# Patient Record
Sex: FEMALE | Race: WHITE | Employment: FULL TIME | URBAN - NONMETROPOLITAN AREA
[De-identification: names, ages, dates, MRNs, and addresses within clinical notes are randomized per-mention and may not be internally consistent; named-entity substitution may affect disease eponyms.]

---

## 2017-03-09 ENCOUNTER — OFFICE VISIT (OUTPATIENT)
Dept: OPTOMETRY | Age: 27
End: 2017-03-09

## 2017-03-09 DIAGNOSIS — H52.203 MYOPIA OF BOTH EYES WITH ASTIGMATISM: Primary | ICD-10-CM

## 2017-03-09 DIAGNOSIS — H52.13 MYOPIA OF BOTH EYES WITH ASTIGMATISM: Primary | ICD-10-CM

## 2017-03-09 PROCEDURE — 92314 C-LENS FITG TECH OU: CPT | Performed by: OPTOMETRIST

## 2017-03-09 PROCEDURE — 92004 COMPRE OPH EXAM NEW PT 1/>: CPT | Performed by: OPTOMETRIST

## 2017-03-09 RX ORDER — BENOXINATE HCL/FLUORESCEIN SOD 0.4%-0.25%
1 DROPS OPHTHALMIC (EYE) ONCE
Status: COMPLETED | OUTPATIENT
Start: 2017-03-09 | End: 2017-03-09

## 2017-03-09 RX ADMIN — Medication 1 DROP: at 10:14

## 2017-03-09 ASSESSMENT — REFRACTION_WEARINGRX
OS_SPHERE: -1.75
SPECS_TYPE: SVL
OD_CYLINDER: DS
OS_CYLINDER: DS
OD_SPHERE: -2.25

## 2017-03-09 ASSESSMENT — SLIT LAMP EXAM - LIDS
COMMENTS: NORMAL
COMMENTS: NORMAL

## 2017-03-09 ASSESSMENT — REFRACTION_MANIFEST
OS_CYLINDER: DS
OS_SPHERE: -2.00
OD_SPHERE: -2.50
OD_CYLINDER: DS

## 2017-03-09 ASSESSMENT — REFRACTION_CURRENTRX
OD_BASECURVE: 8.3
OS_SPHERE: -1.75
OD_SPHERE: -2.25
OS_BASECURVE: 8.3

## 2017-03-09 ASSESSMENT — VISUAL ACUITY
CORRECTION_TYPE: GLASSES
OS_CC: 20/25
METHOD: SNELLEN - LINEAR

## 2017-03-09 ASSESSMENT — TONOMETRY
OD_IOP_MMHG: 14
OS_IOP_MMHG: 15
IOP_METHOD: APPLANATION W FLURESS DROP

## 2017-03-16 ENCOUNTER — OFFICE VISIT (OUTPATIENT)
Dept: OPTOMETRY | Age: 27
End: 2017-03-16

## 2017-03-16 DIAGNOSIS — H52.13 MYOPIA OF BOTH EYES: Primary | ICD-10-CM

## 2017-03-16 PROCEDURE — 99999 PR OFFICE/OUTPT VISIT,PROCEDURE ONLY: CPT | Performed by: OPTOMETRIST

## 2017-03-16 ASSESSMENT — REFRACTION_CURRENTRX
OD_BASECURVE: 8.4
OD_SPHERE: -2.25
OS_SPHERE: -1.75
OS_BASECURVE: 8.4

## 2017-03-16 ASSESSMENT — REFRACTION_WEARINGRX
OD_CYLINDER: DS
OS_CYLINDER: DS
OD_SPHERE: -2.50
OS_SPHERE: -2.00
SPECS_TYPE: NOT FILLED

## 2017-03-16 ASSESSMENT — VISUAL ACUITY
CORRECTION_TYPE: CONTACTS
OS_CC: 20/25
METHOD: SNELLEN - LINEAR

## 2017-04-03 ENCOUNTER — OFFICE VISIT (OUTPATIENT)
Dept: OPTOMETRY | Age: 27
End: 2017-04-03

## 2017-04-03 DIAGNOSIS — H52.13 MYOPIA OF BOTH EYES WITH ASTIGMATISM: Primary | ICD-10-CM

## 2017-04-03 DIAGNOSIS — H52.203 MYOPIA OF BOTH EYES WITH ASTIGMATISM: Primary | ICD-10-CM

## 2017-04-03 PROCEDURE — 99999 PR OFFICE/OUTPT VISIT,PROCEDURE ONLY: CPT | Performed by: OPTOMETRIST

## 2017-04-03 ASSESSMENT — REFRACTION_CURRENTRX
OD_BRAND: BAUSCH & LOMB: PUREVISION2
OS_BRAND: BAUSCH & LOMB: PUREVISION2
OD_BASECURVE: 8.6
OD_SPHERE: -2.50
OS_SPHERE: -2.00
OS_BASECURVE: 8.6

## 2017-04-03 ASSESSMENT — VISUAL ACUITY
METHOD: SNELLEN - LINEAR
CORRECTION_TYPE: CONTACTS
OS_CC: 20/25

## 2017-04-06 ENCOUNTER — OFFICE VISIT (OUTPATIENT)
Dept: PRIMARY CARE CLINIC | Age: 27
End: 2017-04-06
Payer: COMMERCIAL

## 2017-04-06 VITALS
BODY MASS INDEX: 31.97 KG/M2 | HEIGHT: 59 IN | WEIGHT: 158.6 LBS | DIASTOLIC BLOOD PRESSURE: 88 MMHG | RESPIRATION RATE: 16 BRPM | TEMPERATURE: 98.2 F | SYSTOLIC BLOOD PRESSURE: 130 MMHG | HEART RATE: 105 BPM | OXYGEN SATURATION: 98 %

## 2017-04-06 DIAGNOSIS — S63.619A SPRAIN, FINGER, INITIAL ENCOUNTER: Primary | ICD-10-CM

## 2017-04-06 PROCEDURE — 99213 OFFICE O/P EST LOW 20 MIN: CPT | Performed by: FAMILY MEDICINE

## 2017-04-06 ASSESSMENT — ENCOUNTER SYMPTOMS
ALLERGIC/IMMUNOLOGIC NEGATIVE: 1
RESPIRATORY NEGATIVE: 1
EYES NEGATIVE: 1
GASTROINTESTINAL NEGATIVE: 1

## 2017-04-24 ENCOUNTER — OFFICE VISIT (OUTPATIENT)
Dept: PRIMARY CARE CLINIC | Age: 27
End: 2017-04-24
Payer: COMMERCIAL

## 2017-04-24 VITALS
OXYGEN SATURATION: 99 % | HEART RATE: 111 BPM | SYSTOLIC BLOOD PRESSURE: 112 MMHG | TEMPERATURE: 100.3 F | WEIGHT: 161 LBS | DIASTOLIC BLOOD PRESSURE: 66 MMHG | BODY MASS INDEX: 32.46 KG/M2 | HEIGHT: 59 IN | RESPIRATION RATE: 16 BRPM

## 2017-04-24 DIAGNOSIS — J02.9 SORE THROAT: Primary | ICD-10-CM

## 2017-04-24 DIAGNOSIS — J02.0 STREP THROAT: ICD-10-CM

## 2017-04-24 DIAGNOSIS — R50.9 FEVER, UNSPECIFIED FEVER CAUSE: ICD-10-CM

## 2017-04-24 LAB — S PYO AG THROAT QL: POSITIVE

## 2017-04-24 PROCEDURE — 87880 STREP A ASSAY W/OPTIC: CPT | Performed by: PHYSICIAN ASSISTANT

## 2017-04-24 PROCEDURE — 99213 OFFICE O/P EST LOW 20 MIN: CPT | Performed by: PHYSICIAN ASSISTANT

## 2017-04-24 RX ORDER — AMOXICILLIN 875 MG/1
875 TABLET, COATED ORAL 2 TIMES DAILY
Qty: 20 TABLET | Refills: 0 | Status: SHIPPED | OUTPATIENT
Start: 2017-04-24 | End: 2017-10-11 | Stop reason: ALTCHOICE

## 2017-04-24 ASSESSMENT — ENCOUNTER SYMPTOMS
NAUSEA: 1
WHEEZING: 0
VOMITING: 0
COUGH: 0
SHORTNESS OF BREATH: 0
SORE THROAT: 1
TROUBLE SWALLOWING: 1
DIARRHEA: 0

## 2017-05-01 ENCOUNTER — OFFICE VISIT (OUTPATIENT)
Dept: PRIMARY CARE CLINIC | Age: 27
End: 2017-05-01
Payer: COMMERCIAL

## 2017-05-01 VITALS
WEIGHT: 158.2 LBS | HEART RATE: 108 BPM | TEMPERATURE: 99.9 F | SYSTOLIC BLOOD PRESSURE: 122 MMHG | DIASTOLIC BLOOD PRESSURE: 60 MMHG | RESPIRATION RATE: 12 BRPM | BODY MASS INDEX: 31.89 KG/M2 | OXYGEN SATURATION: 98 % | HEIGHT: 59 IN

## 2017-05-01 DIAGNOSIS — J30.2 SEASONAL ALLERGIC RHINITIS, UNSPECIFIED ALLERGIC RHINITIS TRIGGER: ICD-10-CM

## 2017-05-01 DIAGNOSIS — J02.0 STREP THROAT: Primary | ICD-10-CM

## 2017-05-01 PROCEDURE — 99213 OFFICE O/P EST LOW 20 MIN: CPT | Performed by: NURSE PRACTITIONER

## 2017-05-01 RX ORDER — METHYLPREDNISOLONE 4 MG/1
TABLET ORAL
Qty: 1 KIT | Refills: 0 | Status: SHIPPED | OUTPATIENT
Start: 2017-05-01 | End: 2017-10-11 | Stop reason: ALTCHOICE

## 2017-05-01 RX ORDER — FEXOFENADINE HCL 180 MG/1
180 TABLET ORAL DAILY
Qty: 30 TABLET | Refills: 0 | Status: SHIPPED | OUTPATIENT
Start: 2017-05-01 | End: 2017-05-31

## 2017-05-01 ASSESSMENT — ENCOUNTER SYMPTOMS
ALLERGIC/IMMUNOLOGIC NEGATIVE: 1
DIARRHEA: 0
GASTROINTESTINAL NEGATIVE: 1
RESPIRATORY NEGATIVE: 1
CONSTIPATION: 0
TROUBLE SWALLOWING: 0
SHORTNESS OF BREATH: 0
NAUSEA: 0
VOMITING: 0
EYES NEGATIVE: 1
CHEST TIGHTNESS: 0
COUGH: 0
ABDOMINAL PAIN: 0
SORE THROAT: 1
SINUS PRESSURE: 0

## 2017-05-27 ENCOUNTER — HOSPITAL ENCOUNTER (EMERGENCY)
Age: 27
Discharge: HOME | End: 2017-05-27
Attending: EMERGENCY MEDICINE
Payer: MEDICARE

## 2017-05-27 VITALS
TEMPERATURE: 98.6 F | HEIGHT: 59 IN | SYSTOLIC BLOOD PRESSURE: 121 MMHG | OXYGEN SATURATION: 98 % | DIASTOLIC BLOOD PRESSURE: 87 MMHG | RESPIRATION RATE: 16 BRPM | WEIGHT: 160 LBS | HEART RATE: 83 BPM | BODY MASS INDEX: 32.25 KG/M2

## 2017-05-27 DIAGNOSIS — J06.9 UPPER RESPIRATORY TRACT INFECTION, UNSPECIFIED TYPE: Primary | ICD-10-CM

## 2017-05-27 PROCEDURE — 99283 EMERGENCY DEPT VISIT LOW MDM: CPT

## 2017-05-27 RX ORDER — BENZONATATE 100 MG/1
100 CAPSULE ORAL 3 TIMES DAILY PRN
Qty: 21 CAPSULE | Refills: 0 | Status: SHIPPED | OUTPATIENT
Start: 2017-05-27 | End: 2017-06-03

## 2017-05-27 RX ORDER — ALBUTEROL SULFATE 90 UG/1
1-2 AEROSOL, METERED RESPIRATORY (INHALATION) EVERY 4 HOURS PRN
Qty: 1 INHALER | Refills: 0 | Status: SHIPPED | OUTPATIENT
Start: 2017-05-27 | End: 2018-01-09 | Stop reason: ALTCHOICE

## 2017-05-28 ASSESSMENT — ENCOUNTER SYMPTOMS
COUGH: 1
NAUSEA: 0
VOMITING: 0
SHORTNESS OF BREATH: 0

## 2017-10-11 ENCOUNTER — HOSPITAL ENCOUNTER (OUTPATIENT)
Age: 27
Setting detail: SPECIMEN
Discharge: HOME | End: 2017-10-11
Payer: MEDICARE

## 2017-10-11 ENCOUNTER — OFFICE VISIT (OUTPATIENT)
Dept: PRIMARY CARE CLINIC | Age: 27
End: 2017-10-11
Payer: MEDICARE

## 2017-10-11 VITALS
BODY MASS INDEX: 31.65 KG/M2 | RESPIRATION RATE: 16 BRPM | WEIGHT: 157 LBS | SYSTOLIC BLOOD PRESSURE: 100 MMHG | HEART RATE: 84 BPM | HEIGHT: 59 IN | TEMPERATURE: 98 F | DIASTOLIC BLOOD PRESSURE: 70 MMHG | OXYGEN SATURATION: 98 %

## 2017-10-11 DIAGNOSIS — R10.33 PERIUMBILICAL ABDOMINAL PAIN: Primary | ICD-10-CM

## 2017-10-11 DIAGNOSIS — M54.50 THORACOLUMBAR BACK PAIN: ICD-10-CM

## 2017-10-11 DIAGNOSIS — M54.6 THORACOLUMBAR BACK PAIN: ICD-10-CM

## 2017-10-11 DIAGNOSIS — R10.9 FLANK PAIN: ICD-10-CM

## 2017-10-11 LAB
-: NORMAL
AMORPHOUS: NORMAL
BACTERIA: NORMAL
BILIRUBIN URINE: NEGATIVE
CASTS UA: NORMAL /LPF (ref 0–2)
COLOR: ABNORMAL
COMMENT UA: ABNORMAL
CRYSTALS, UA: NORMAL /HPF
EPITHELIAL CELLS UA: NORMAL /HPF (ref 0–5)
GLUCOSE URINE: NEGATIVE
KETONES, URINE: NEGATIVE
LEUKOCYTE ESTERASE, URINE: NEGATIVE
MUCUS: NORMAL
NITRITE, URINE: NEGATIVE
OTHER OBSERVATIONS UA: NORMAL
PH UA: 7.5 (ref 5–6)
PROTEIN UA: NEGATIVE
RBC UA: NORMAL /HPF (ref 0–4)
RENAL EPITHELIAL, UA: NORMAL /HPF
SPECIFIC GRAVITY UA: 1.01 (ref 1.01–1.02)
TRICHOMONAS: NORMAL
TURBIDITY: ABNORMAL
URINE HGB: NEGATIVE
UROBILINOGEN, URINE: NORMAL
WBC UA: NORMAL /HPF (ref 0–4)
YEAST: NORMAL

## 2017-10-11 PROCEDURE — G8417 CALC BMI ABV UP PARAM F/U: HCPCS | Performed by: FAMILY MEDICINE

## 2017-10-11 PROCEDURE — 4004F PT TOBACCO SCREEN RCVD TLK: CPT | Performed by: FAMILY MEDICINE

## 2017-10-11 PROCEDURE — G8484 FLU IMMUNIZE NO ADMIN: HCPCS | Performed by: FAMILY MEDICINE

## 2017-10-11 PROCEDURE — 81001 URINALYSIS AUTO W/SCOPE: CPT

## 2017-10-11 PROCEDURE — G8427 DOCREV CUR MEDS BY ELIG CLIN: HCPCS | Performed by: FAMILY MEDICINE

## 2017-10-11 PROCEDURE — 99214 OFFICE O/P EST MOD 30 MIN: CPT | Performed by: FAMILY MEDICINE

## 2017-10-11 NOTE — PATIENT INSTRUCTIONS
Patient Education        Flank Pain: Care Instructions  Your Care Instructions  Flank pain is pain on the side of the back just below the rib cage and above the waist. It can be on one or both sides. Flank pain has many possible causes, including a kidney stone, a urinary tract infection, or back strain. Flank pain may get better on its own. But don't ignore new symptoms, such as fever, nausea and vomiting, urination problems, pain that gets worse, and dizziness. These may be signs of a more serious problem. You may have to have tests or other treatment. Follow-up care is a key part of your treatment and safety. Be sure to make and go to all appointments, and call your doctor if you are having problems. It's also a good idea to know your test results and keep a list of the medicines you take. How can you care for yourself at home? · Rest until you feel better. · Take pain medicines exactly as directed. ¨ If the doctor gave you a prescription medicine for pain, take it as prescribed. ¨ If you are not taking a prescription pain medicine, ask your doctor if you can take an over-the-counter pain medicine, such as acetaminophen (Tylenol), ibuprofen (Advil, Motrin), or naproxen (Aleve). Read and follow all instructions on the label. · If your doctor prescribed antibiotics, take them as directed. Do not stop taking them just because you feel better. You need to take the full course of antibiotics. · To apply heat, put a warm water bottle, a heating pad set on low, or a warm cloth on the painful area. Do not go to sleep with a heating pad on your skin. · To prevent dehydration, drink plenty of fluids, enough so that your urine is light yellow or clear like water. Choose water and other caffeine-free clear liquids until you feel better. If you have kidney, heart, or liver disease and have to limit fluids, talk with your doctor before you increase the amount of fluids you drink.   When should you call for

## 2017-10-11 NOTE — PROGRESS NOTES
Systems   Gastrointestinal: Positive for abdominal pain (in umbilicus). Genitourinary: Positive for flank pain (b/l, \"pressure\"). Negative for dysuria, frequency, hematuria and urgency. Objective:     Vitals:    10/11/17 1810   BP: 100/70   Pulse: 84   Resp: 16   Temp: 98 °F (36.7 °C)   SpO2: 98%   Weight: 157 lb (71.2 kg)   Height: 4' 11\" (1.499 m)     Physical Exam   Constitutional: She is oriented to person, place, and time. She appears well-developed and well-nourished. No distress. HENT:   Head: Normocephalic and atraumatic. Eyes: Conjunctivae are normal.   Neck: Neck supple. Cardiovascular: Normal rate, regular rhythm and normal heart sounds. Pulmonary/Chest: Effort normal and breath sounds normal. No respiratory distress. Abdominal: Soft. Bowel sounds are normal. She exhibits no distension. There is tenderness (within umbilicus, there is a palpable firm area, approx 1-2 cm in size. Tender to touch. No overlying skin changes. ). There is no CVA tenderness (no exquisite tenderness, but slightly sore to touch). Musculoskeletal: She exhibits no edema. Neurological: She is alert and oriented to person, place, and time. Skin: Skin is warm and dry. Psychiatric: She has a normal mood and affect. Assessment:      1. Periumbilical abdominal pain     2. Thoracolumbar back pain     3. Flank pain  UA W/REFLEX CULTURE       Plan:      UA negative for signs of UTI. Recommend increased fluids, Tylenol/Motrin, and heating pad over both areas of pain. Pt to call if she is not improving, and may order imaging at that time. Return if symptoms worsen or fail to improve in 5-7 days. Orders Placed This Encounter   Procedures    UA W/REFLEX CULTURE     Standing Status:   Future     Number of Occurrences:   1     Standing Expiration Date:   10/11/2017       Patient given educational materials - see patient instructions. All patient questions answered. Pt voiced understanding. Electronically signed by Rivas Sagastume DO on 10/22/2017 at 10:30 PM

## 2017-10-22 ASSESSMENT — ENCOUNTER SYMPTOMS: ABDOMINAL PAIN: 1

## 2017-11-15 ENCOUNTER — OFFICE VISIT (OUTPATIENT)
Dept: PRIMARY CARE CLINIC | Age: 27
End: 2017-11-15
Payer: MEDICARE

## 2017-11-15 VITALS
HEART RATE: 72 BPM | DIASTOLIC BLOOD PRESSURE: 60 MMHG | TEMPERATURE: 97.7 F | BODY MASS INDEX: 30.28 KG/M2 | WEIGHT: 150.2 LBS | SYSTOLIC BLOOD PRESSURE: 100 MMHG | OXYGEN SATURATION: 100 % | HEIGHT: 59 IN

## 2017-11-15 DIAGNOSIS — J02.9 SORE THROAT: ICD-10-CM

## 2017-11-15 DIAGNOSIS — J01.40 ACUTE PANSINUSITIS, RECURRENCE NOT SPECIFIED: Primary | ICD-10-CM

## 2017-11-15 LAB — S PYO AG THROAT QL: NORMAL

## 2017-11-15 PROCEDURE — G8417 CALC BMI ABV UP PARAM F/U: HCPCS | Performed by: PHYSICIAN ASSISTANT

## 2017-11-15 PROCEDURE — G8484 FLU IMMUNIZE NO ADMIN: HCPCS | Performed by: PHYSICIAN ASSISTANT

## 2017-11-15 PROCEDURE — 99213 OFFICE O/P EST LOW 20 MIN: CPT | Performed by: PHYSICIAN ASSISTANT

## 2017-11-15 PROCEDURE — 4004F PT TOBACCO SCREEN RCVD TLK: CPT | Performed by: PHYSICIAN ASSISTANT

## 2017-11-15 PROCEDURE — G8427 DOCREV CUR MEDS BY ELIG CLIN: HCPCS | Performed by: PHYSICIAN ASSISTANT

## 2017-11-15 PROCEDURE — 87880 STREP A ASSAY W/OPTIC: CPT | Performed by: PHYSICIAN ASSISTANT

## 2017-11-15 RX ORDER — AMOXICILLIN AND CLAVULANATE POTASSIUM 875; 125 MG/1; MG/1
1 TABLET, FILM COATED ORAL 2 TIMES DAILY
Qty: 20 TABLET | Refills: 0 | Status: SHIPPED | OUTPATIENT
Start: 2017-11-15 | End: 2017-11-25

## 2017-11-15 ASSESSMENT — ENCOUNTER SYMPTOMS
COUGH: 1
SWOLLEN GLANDS: 1
SORE THROAT: 1

## 2017-11-15 NOTE — PATIENT INSTRUCTIONS
hot, wet towel or a warm gel pack on your face 3 or 4 times a day for 5 to 10 minutes each time. · Try a decongestant nasal spray like oxymetazoline (Afrin). Do not use it for more than 3 days in a row. Using it for more than 3 days can make your congestion worse. When should you call for help? Call your doctor now or seek immediate medical care if:  · You have new or worse swelling or redness in your face or around your eyes. · You have a new or higher fever. Watch closely for changes in your health, and be sure to contact your doctor if:  · You have new or worse facial pain. · The mucus from your nose becomes thicker (like pus) or has new blood in it. · You are not getting better as expected. Where can you learn more? Go to https://[employer]el[employer]uele[employer].Spendji. org and sign in to your [employer] account. Enter S704 in the [employer] box to learn more about \"Sinusitis: Care Instructions. \"     If you do not have an account, please click on the \"Sign Up Now\" link. Current as of: July 29, 2016  Content Version: 11.3  © 5773-1052 [employer], [employer]. Care instructions adapted under license by [facility] ([facility]). If you have questions about a medical condition or this instruction, always ask your healthcare professional. [address] any warranty or liability for your use of this information.

## 2017-12-13 ENCOUNTER — OFFICE VISIT (OUTPATIENT)
Dept: PRIMARY CARE CLINIC | Age: 27
End: 2017-12-13
Payer: MEDICARE

## 2017-12-13 VITALS
HEIGHT: 59 IN | HEART RATE: 60 BPM | DIASTOLIC BLOOD PRESSURE: 80 MMHG | SYSTOLIC BLOOD PRESSURE: 120 MMHG | WEIGHT: 154 LBS | BODY MASS INDEX: 31.04 KG/M2

## 2017-12-13 DIAGNOSIS — S16.1XXA NECK STRAIN, INITIAL ENCOUNTER: Primary | ICD-10-CM

## 2017-12-13 PROCEDURE — G8484 FLU IMMUNIZE NO ADMIN: HCPCS | Performed by: NURSE PRACTITIONER

## 2017-12-13 PROCEDURE — G8427 DOCREV CUR MEDS BY ELIG CLIN: HCPCS | Performed by: NURSE PRACTITIONER

## 2017-12-13 PROCEDURE — G8417 CALC BMI ABV UP PARAM F/U: HCPCS | Performed by: NURSE PRACTITIONER

## 2017-12-13 PROCEDURE — 4004F PT TOBACCO SCREEN RCVD TLK: CPT | Performed by: NURSE PRACTITIONER

## 2017-12-13 PROCEDURE — 99213 OFFICE O/P EST LOW 20 MIN: CPT | Performed by: NURSE PRACTITIONER

## 2017-12-13 RX ORDER — NAPROXEN 500 MG/1
500 TABLET ORAL 2 TIMES DAILY WITH MEALS
Qty: 60 TABLET | Refills: 3 | Status: SHIPPED | OUTPATIENT
Start: 2017-12-13 | End: 2018-06-05 | Stop reason: ALTCHOICE

## 2017-12-13 RX ORDER — PREDNISONE 20 MG/1
20 TABLET ORAL 2 TIMES DAILY
Qty: 10 TABLET | Refills: 0 | Status: SHIPPED | OUTPATIENT
Start: 2017-12-13 | End: 2017-12-18

## 2017-12-13 RX ORDER — KETOROLAC TROMETHAMINE 30 MG/ML
30 INJECTION, SOLUTION INTRAMUSCULAR; INTRAVENOUS ONCE
Status: COMPLETED | OUTPATIENT
Start: 2017-12-13 | End: 2017-12-13

## 2017-12-13 RX ADMIN — KETOROLAC TROMETHAMINE 30 MG: 30 INJECTION, SOLUTION INTRAMUSCULAR; INTRAVENOUS at 16:06

## 2017-12-13 ASSESSMENT — ENCOUNTER SYMPTOMS: RESPIRATORY NEGATIVE: 1

## 2017-12-13 NOTE — PATIENT INSTRUCTIONS
Patient Education        Neck Strain: Care Instructions  Your Care Instructions    You have strained the muscles and ligaments in your neck. A sudden, awkward movement can strain the neck. This often occurs with falls or car accidents or during certain sports. Everyday activities like working on a computer or sleeping can also cause neck strain if they force you to hold your neck in an awkward position for a long time. It is common for neck pain to get worse for a day or two after an injury, but it should start to feel better after that. You may have more pain and stiffness for several days before it gets better. This is expected. It may take a few weeks or longer for it to heal completely. Good home treatment can help you get better faster and avoid future neck problems. Follow-up care is a key part of your treatment and safety. Be sure to make and go to all appointments, and call your doctor if you are having problems. It's also a good idea to know your test results and keep a list of the medicines you take. How can you care for yourself at home? · If you were given a neck brace (cervical collar) to limit neck motion, wear it as instructed for as many days as your doctor tells you to. Do not wear it longer than you were told to. Wearing a brace for too long can make neck stiffness worse and weaken the neck muscles. · You can try using heat or ice to see if it helps. ¨ Try using a heating pad on a low or medium setting for 15 to 20 minutes every 2 to 3 hours. Try a warm shower in place of one session with the heating pad. You can also buy single-use heat wraps that last up to 8 hours. ¨ You can also try an ice pack for 10 to 15 minutes every 2 to 3 hours. · Take pain medicines exactly as directed. ¨ If the doctor gave you a prescription medicine for pain, take it as prescribed. ¨ If you are not taking a prescription pain medicine, ask your doctor if you can take an over-the-counter medicine.   · Gently pressure from your fingers to keep your head from bending. 3. Hold for about 6 seconds. 4. Repeat 8 to 12 times. Chin tuck    1. Lie on the floor with a rolled-up towel under your neck. Your head should be touching the floor. 2. Slowly bring your chin toward your chest.  3. Hold for a count of 6, and then relax for up to 10 seconds. 4. Repeat 8 to 12 times. Follow-up care is a key part of your treatment and safety. Be sure to make and go to all appointments, and call your doctor if you are having problems. It's also a good idea to know your test results and keep a list of the medicines you take. Where can you learn more? Go to https://"[employer].Lightside Games. org and sign in to your Son[employer]ation account. Enter M679 in the [employer] box to learn more about \"Neck Strain or Sprain: Rehab Exercises. \"     If you do not have an account, please click on the \"Sign Up Now\" link. Current as of: March 21, 2017  Content Version: 11.4  © 1724-4483 Healthwise, Incorporated. Care instructions adapted under license by [facility] ([facility]). If you have questions about a medical condition or this instruction, always ask your healthcare professional. [address] any warranty or liability for your use of this information.

## 2017-12-13 NOTE — PROGRESS NOTES
the lungs every 4 hours as needed for Wheezing Dispense with spacer. 1 Inhaler 0     No current facility-administered medications for this visit. She has No Known Allergies. .    She  reports that she has been smoking Cigarettes. She has a 3.00 pack-year smoking history. She has never used smokeless tobacco.      Objective:    Vitals:    12/13/17 1537   BP: 120/80   Pulse: 60     Body mass index is 31.1 kg/m². Review of Systems   Constitutional: Negative for appetite change, chills and fever. Respiratory: Negative. Cardiovascular: Negative. Musculoskeletal: Negative for stiffness. Neck and right shoulder pain at rest and worse with movement of neck to the left and up. Skin: Negative for itching. Neurological: Positive for tingling (right hand and arm). Negative for numbness. Physical Exam   Constitutional: She is oriented to person, place, and time. She appears well-developed and well-nourished. HENT:   Head: Normocephalic. Eyes: Pupils are equal, round, and reactive to light. Neck: Normal range of motion. Neck supple. Cardiovascular: Normal rate, regular rhythm and normal heart sounds. Pulmonary/Chest: Effort normal and breath sounds normal.   Musculoskeletal:        Right shoulder: She exhibits tenderness (with palpation). She exhibits normal range of motion, no bony tenderness, no swelling, no effusion, no crepitus, no deformity, no pain and no spasm. Cervical back: She exhibits normal range of motion, no tenderness, no bony tenderness, no swelling, no edema, no deformity, no pain and no spasm. Arms:  Neurological: She is alert and oriented to person, place, and time. She has normal strength. No cranial nerve deficit or sensory deficit. Skin: Skin is warm and dry. Psychiatric: She has a normal mood and affect. Her behavior is normal. Thought content normal.   Nursing note and vitals reviewed. Assessment and Plan:    1.  Neck strain, initial encounter  naproxen (NAPROSYN) 500 MG tablet    predniSONE (DELTASONE) 20 MG tablet    ketorolac (TORADOL) injection 30 mg     Take naproxen and prednisone as prescribed. May alternate with tylenol as needed for pain. Neck stretches provided. Increase rest and water intake. May use heat as needed. Follow up with PCP if symptoms persist of worsen.     Electronically signed by Katty Eldridge NP on 12/13/17 at 3:41 PM

## 2018-01-09 ENCOUNTER — OFFICE VISIT (OUTPATIENT)
Dept: PRIMARY CARE CLINIC | Age: 28
End: 2018-01-09
Payer: MEDICARE

## 2018-01-09 VITALS
BODY MASS INDEX: 30.8 KG/M2 | HEIGHT: 59 IN | SYSTOLIC BLOOD PRESSURE: 98 MMHG | HEART RATE: 81 BPM | TEMPERATURE: 98.9 F | RESPIRATION RATE: 16 BRPM | WEIGHT: 152.8 LBS | OXYGEN SATURATION: 100 % | DIASTOLIC BLOOD PRESSURE: 64 MMHG

## 2018-01-09 DIAGNOSIS — J02.9 SORE THROAT: Primary | ICD-10-CM

## 2018-01-09 LAB — S PYO AG THROAT QL: NORMAL

## 2018-01-09 PROCEDURE — 99212 OFFICE O/P EST SF 10 MIN: CPT | Performed by: NURSE PRACTITIONER

## 2018-01-09 PROCEDURE — G8427 DOCREV CUR MEDS BY ELIG CLIN: HCPCS | Performed by: NURSE PRACTITIONER

## 2018-01-09 PROCEDURE — 4004F PT TOBACCO SCREEN RCVD TLK: CPT | Performed by: NURSE PRACTITIONER

## 2018-01-09 PROCEDURE — 87880 STREP A ASSAY W/OPTIC: CPT | Performed by: NURSE PRACTITIONER

## 2018-01-09 PROCEDURE — G8417 CALC BMI ABV UP PARAM F/U: HCPCS | Performed by: NURSE PRACTITIONER

## 2018-01-09 PROCEDURE — G8484 FLU IMMUNIZE NO ADMIN: HCPCS | Performed by: NURSE PRACTITIONER

## 2018-01-09 ASSESSMENT — ENCOUNTER SYMPTOMS
COUGH: 0
SORE THROAT: 1

## 2018-01-09 NOTE — PATIENT INSTRUCTIONS
Patient Education        Rapid Strep Test: About This Test  What is it? A rapid strep test checks the bacteria in your throat to see if strep is the cause of your sore throat. Why is this test done? It may be done so your doctor can find out right away whether you have strep throat. There is another test for strep, called a throat culture, but that test takes a few days to get the results. How can you prepare for the test?  You don't need to do anything before you have this test.  What happens during the test?  · You will be asked to tilt your head back and open your mouth as wide as possible. · Your doctor will press your tongue down with a flat stick (tongue depressor) and then examine your mouth and throat. · A clean cotton swab will be rubbed over the back of your throat, around your tonsils, and over any red areas or sores to collect a sample. How long does the test take? · The test takes less than a minute. · Results are available in 10 to 15 minutes. Follow-up care is a key part of your treatment and safety. Be sure to make and go to all appointments, and call your doctor if you are having problems. It's also a good idea to keep a list of the medicines you take. Ask your doctor when you can expect to have your test results. Where can you learn more? Go to https://[employer].Quantine. org and sign in to your [facility] account. Enter B356 in the [facility] box to learn more about \"Rapid Strep Test: About This Test.\"     If you do not have an account, please click on the \"Sign Up Now\" link. Current as of: May 12, 2017  Content Version: 11.5  © 7310-0634 [employer]. Care instructions adapted under license by [facility] ([facility]). If you have questions about a medical condition or this instruction, always ask your healthcare professional. [place] [address] any warranty or liability for your use of this information.        Patient Education        Sore Throat: Care Instructions  Your Care Instructions    Infection by bacteria or a virus causes most sore throats. Cigarette smoke, dry air, air pollution, allergies, and yelling can also cause a sore throat. Sore throats can be painful and annoying. Fortunately, most sore throats go away on their own. If you have a bacterial infection, your doctor may prescribe antibiotics. Follow-up care is a key part of your treatment and safety. Be sure to make and go to all appointments, and call your doctor if you are having problems. It's also a good idea to know your test results and keep a list of the medicines you take. How can you care for yourself at home? · If your doctor prescribed antibiotics, take them as directed. Do not stop taking them just because you feel better. You need to take the full course of antibiotics. · Gargle with warm salt water once an hour to help reduce swelling and relieve discomfort. Use 1 teaspoon of salt mixed in 1 cup of warm water. · Take an over-the-counter pain medicine, such as acetaminophen (Tylenol), ibuprofen (Advil, Motrin), or naproxen (Aleve). Read and follow all instructions on the label. · Be careful when taking over-the-counter cold or flu medicines and Tylenol at the same time. Many of these medicines have acetaminophen, which is Tylenol. Read the labels to make sure that you are not taking more than the recommended dose. Too much acetaminophen (Tylenol) can be harmful. · Drink plenty of fluids. Fluids may help soothe an irritated throat. Hot fluids, such as tea or soup, may help decrease throat pain. · Use over-the-counter throat lozenges to soothe pain. Regular cough drops or hard candy may also help. These should not be given to young children because of the risk of choking. · Do not smoke or allow others to smoke around you. If you need help quitting, talk to your doctor about stop-smoking programs and medicines.  These can increase your chances of quitting for

## 2018-02-13 ENCOUNTER — OFFICE VISIT (OUTPATIENT)
Dept: PRIMARY CARE CLINIC | Age: 28
End: 2018-02-13
Payer: MEDICARE

## 2018-02-13 VITALS
OXYGEN SATURATION: 100 % | WEIGHT: 155 LBS | TEMPERATURE: 98.2 F | SYSTOLIC BLOOD PRESSURE: 120 MMHG | DIASTOLIC BLOOD PRESSURE: 84 MMHG | HEART RATE: 80 BPM | BODY MASS INDEX: 31.31 KG/M2

## 2018-02-13 DIAGNOSIS — J06.9 UPPER RESPIRATORY TRACT INFECTION, UNSPECIFIED TYPE: Primary | ICD-10-CM

## 2018-02-13 DIAGNOSIS — F17.210 CIGARETTE SMOKER: ICD-10-CM

## 2018-02-13 PROCEDURE — G8427 DOCREV CUR MEDS BY ELIG CLIN: HCPCS | Performed by: PHYSICIAN ASSISTANT

## 2018-02-13 PROCEDURE — 4004F PT TOBACCO SCREEN RCVD TLK: CPT | Performed by: PHYSICIAN ASSISTANT

## 2018-02-13 PROCEDURE — 99213 OFFICE O/P EST LOW 20 MIN: CPT | Performed by: PHYSICIAN ASSISTANT

## 2018-02-13 PROCEDURE — G8484 FLU IMMUNIZE NO ADMIN: HCPCS | Performed by: PHYSICIAN ASSISTANT

## 2018-02-13 PROCEDURE — G8417 CALC BMI ABV UP PARAM F/U: HCPCS | Performed by: PHYSICIAN ASSISTANT

## 2018-02-13 RX ORDER — ALBUTEROL SULFATE 90 UG/1
2 AEROSOL, METERED RESPIRATORY (INHALATION) EVERY 6 HOURS PRN
Qty: 1 INHALER | Refills: 0 | Status: SHIPPED | OUTPATIENT
Start: 2018-02-13 | End: 2018-09-27 | Stop reason: ALTCHOICE

## 2018-02-13 RX ORDER — DEXTROMETHORPHAN HYDROBROMIDE AND PROMETHAZINE HYDROCHLORIDE 15; 6.25 MG/5ML; MG/5ML
5 SYRUP ORAL 4 TIMES DAILY PRN
Qty: 120 ML | Refills: 0 | Status: SHIPPED | OUTPATIENT
Start: 2018-02-13 | End: 2018-02-20

## 2018-02-13 ASSESSMENT — ENCOUNTER SYMPTOMS
COUGH: 0
SORE THROAT: 1
SINUS COMPLAINT: 1
SINUS PRESSURE: 1

## 2018-06-05 ENCOUNTER — OFFICE VISIT (OUTPATIENT)
Dept: PRIMARY CARE CLINIC | Age: 28
End: 2018-06-05

## 2018-06-05 VITALS
OXYGEN SATURATION: 98 % | HEART RATE: 74 BPM | WEIGHT: 155.4 LBS | TEMPERATURE: 98 F | DIASTOLIC BLOOD PRESSURE: 64 MMHG | BODY MASS INDEX: 31.33 KG/M2 | SYSTOLIC BLOOD PRESSURE: 112 MMHG | HEIGHT: 59 IN

## 2018-06-05 DIAGNOSIS — B97.89 VIRAL SINUSITIS: Primary | ICD-10-CM

## 2018-06-05 DIAGNOSIS — J32.9 VIRAL SINUSITIS: Primary | ICD-10-CM

## 2018-06-05 PROCEDURE — 99213 OFFICE O/P EST LOW 20 MIN: CPT | Performed by: NURSE PRACTITIONER

## 2018-06-05 RX ORDER — PREDNISONE 20 MG/1
20 TABLET ORAL DAILY
Qty: 5 TABLET | Refills: 0 | Status: SHIPPED | OUTPATIENT
Start: 2018-06-05 | End: 2018-06-10

## 2018-06-05 ASSESSMENT — ENCOUNTER SYMPTOMS
ABDOMINAL PAIN: 0
PHOTOPHOBIA: 1
RESPIRATORY NEGATIVE: 1
SINUS PRESSURE: 0
RHINORRHEA: 0
SORE THROAT: 0
NAUSEA: 1
VOMITING: 0
DIARRHEA: 0

## 2018-09-24 ENCOUNTER — OFFICE VISIT (OUTPATIENT)
Dept: PODIATRY | Age: 28
End: 2018-09-24
Payer: MEDICARE

## 2018-09-24 VITALS
RESPIRATION RATE: 20 BRPM | DIASTOLIC BLOOD PRESSURE: 70 MMHG | SYSTOLIC BLOOD PRESSURE: 124 MMHG | HEART RATE: 72 BPM | HEIGHT: 59 IN | BODY MASS INDEX: 31.81 KG/M2 | WEIGHT: 157.8 LBS

## 2018-09-24 DIAGNOSIS — M79.672 FOOT PAIN, LEFT: Primary | ICD-10-CM

## 2018-09-24 DIAGNOSIS — L84 CORNS AND CALLOSITIES: ICD-10-CM

## 2018-09-24 DIAGNOSIS — M25.80 SESAMOIDITIS: ICD-10-CM

## 2018-09-24 PROCEDURE — G8427 DOCREV CUR MEDS BY ELIG CLIN: HCPCS | Performed by: PODIATRIST

## 2018-09-24 PROCEDURE — G8417 CALC BMI ABV UP PARAM F/U: HCPCS | Performed by: PODIATRIST

## 2018-09-24 PROCEDURE — 99202 OFFICE O/P NEW SF 15 MIN: CPT | Performed by: PODIATRIST

## 2018-09-24 PROCEDURE — L3040 FT ARCH SUPRT PREMOLD LONGIT: HCPCS | Performed by: PODIATRIST

## 2018-09-24 PROCEDURE — 4004F PT TOBACCO SCREEN RCVD TLK: CPT | Performed by: PODIATRIST

## 2018-09-24 RX ORDER — IBUPROFEN 800 MG/1
TABLET ORAL
COMMUNITY
Start: 2018-09-17 | End: 2018-09-27 | Stop reason: ALTCHOICE

## 2018-09-24 RX ORDER — ACETAMINOPHEN AND CODEINE PHOSPHATE 300; 30 MG/1; MG/1
TABLET ORAL
COMMUNITY
Start: 2018-09-17 | End: 2018-09-27 | Stop reason: ALTCHOICE

## 2018-09-24 NOTE — PROGRESS NOTES
WB.  Use OTC anti infalmmatory PRN  Contact office with any questions/problems/concerns.   RTC in post testing

## 2018-09-26 ENCOUNTER — HOSPITAL ENCOUNTER (OUTPATIENT)
Dept: INTERVENTIONAL RADIOLOGY/VASCULAR | Age: 28
Discharge: HOME | End: 2018-09-28
Payer: MEDICARE

## 2018-09-26 DIAGNOSIS — M79.672 FOOT PAIN, LEFT: ICD-10-CM

## 2018-09-26 PROCEDURE — 76882 US LMTD JT/FCL EVL NVASC XTR: CPT

## 2018-09-27 ENCOUNTER — HOSPITAL ENCOUNTER (OUTPATIENT)
Age: 28
Setting detail: SPECIMEN
Discharge: HOME | End: 2018-09-27
Payer: MEDICARE

## 2018-09-27 ENCOUNTER — TELEPHONE (OUTPATIENT)
Dept: PODIATRY | Age: 28
End: 2018-09-27

## 2018-09-27 ENCOUNTER — OFFICE VISIT (OUTPATIENT)
Dept: FAMILY MEDICINE CLINIC | Age: 28
End: 2018-09-27
Payer: MEDICARE

## 2018-09-27 VITALS
HEIGHT: 59 IN | SYSTOLIC BLOOD PRESSURE: 90 MMHG | TEMPERATURE: 98.6 F | HEART RATE: 73 BPM | OXYGEN SATURATION: 99 % | BODY MASS INDEX: 31.45 KG/M2 | DIASTOLIC BLOOD PRESSURE: 62 MMHG | WEIGHT: 156 LBS

## 2018-09-27 DIAGNOSIS — R82.90 ABNORMAL URINE ODOR: ICD-10-CM

## 2018-09-27 DIAGNOSIS — L02.412 ABSCESS OF AXILLA, LEFT: Primary | ICD-10-CM

## 2018-09-27 LAB
-: ABNORMAL
AMORPHOUS: ABNORMAL
BACTERIA: ABNORMAL
BILIRUBIN URINE: NEGATIVE
CASTS UA: ABNORMAL /LPF (ref 0–2)
COLOR: ABNORMAL
COMMENT UA: ABNORMAL
CRYSTALS, UA: ABNORMAL /HPF
EPITHELIAL CELLS UA: ABNORMAL /HPF (ref 0–5)
GLUCOSE URINE: NEGATIVE
KETONES, URINE: NEGATIVE
LEUKOCYTE ESTERASE, URINE: NEGATIVE
MUCUS: ABNORMAL
NITRITE, URINE: NEGATIVE
OTHER OBSERVATIONS UA: ABNORMAL
PH UA: 7 (ref 5–6)
PROTEIN UA: NEGATIVE
RBC UA: ABNORMAL /HPF (ref 0–4)
RENAL EPITHELIAL, UA: ABNORMAL /HPF
SPECIFIC GRAVITY UA: 1 (ref 1.01–1.02)
TRICHOMONAS: ABNORMAL
TURBIDITY: ABNORMAL
URINE HGB: NEGATIVE
UROBILINOGEN, URINE: NORMAL
WBC UA: ABNORMAL /HPF (ref 0–4)
YEAST: ABNORMAL

## 2018-09-27 PROCEDURE — 4004F PT TOBACCO SCREEN RCVD TLK: CPT | Performed by: FAMILY MEDICINE

## 2018-09-27 PROCEDURE — G8427 DOCREV CUR MEDS BY ELIG CLIN: HCPCS | Performed by: FAMILY MEDICINE

## 2018-09-27 PROCEDURE — 99214 OFFICE O/P EST MOD 30 MIN: CPT | Performed by: FAMILY MEDICINE

## 2018-09-27 PROCEDURE — 81001 URINALYSIS AUTO W/SCOPE: CPT

## 2018-09-27 PROCEDURE — G8417 CALC BMI ABV UP PARAM F/U: HCPCS | Performed by: FAMILY MEDICINE

## 2018-09-27 RX ORDER — SULFAMETHOXAZOLE AND TRIMETHOPRIM 800; 160 MG/1; MG/1
1 TABLET ORAL 2 TIMES DAILY
Qty: 20 TABLET | Refills: 0 | Status: SHIPPED | OUTPATIENT
Start: 2018-09-27 | End: 2018-10-07

## 2018-09-27 ASSESSMENT — PATIENT HEALTH QUESTIONNAIRE - PHQ9
SUM OF ALL RESPONSES TO PHQ QUESTIONS 1-9: 0
SUM OF ALL RESPONSES TO PHQ9 QUESTIONS 1 & 2: 0
1. LITTLE INTEREST OR PLEASURE IN DOING THINGS: 0
2. FEELING DOWN, DEPRESSED OR HOPELESS: 0
SUM OF ALL RESPONSES TO PHQ QUESTIONS 1-9: 0

## 2018-09-27 ASSESSMENT — ENCOUNTER SYMPTOMS
SHORTNESS OF BREATH: 0
COLOR CHANGE: 0
WHEEZING: 0
DIARRHEA: 0
CHEST TIGHTNESS: 0
NAUSEA: 0

## 2018-09-27 NOTE — TELEPHONE ENCOUNTER
----- Message from Nelsy Gamboa DPM sent at 9/27/2018  9:12 AM EDT -----  Contact pt, make sure she has follow up, would advise to excise the area in the office if pain continues

## 2018-09-27 NOTE — PROGRESS NOTES
Abnormal urine odor  Urinalysis Reflex to Culture             Plan:       ABscess of axilla: new; I tried to clean out the area and I was able to clear a lot of deodorant and hair debris. No exudate was expressed. The area is somewhat indurated so I started her on antibiotics. If the skin does not close she will call and I will refer her to surgery for further recommendations. Strong urine odor: new; I will check a UA although I suspect she is dehydrated and I told her to drink more water and non caffeinated beverages. Return if symptoms worsen or fail to improve. Orders Placed This Encounter   Procedures    Urinalysis Reflex to Culture     Standing Status:   Future     Number of Occurrences:   1     Standing Expiration Date:   9/27/2019     Order Specific Question:   SPECIFY(EX-CATH,MIDSTREAM,CYSTO,ETC)? Answer:   midstream     Orders Placed This Encounter   Medications    sulfamethoxazole-trimethoprim (BACTRIM DS) 800-160 MG per tablet     Sig: Take 1 tablet by mouth 2 times daily for 10 days     Dispense:  20 tablet     Refill:  0       Patient given educational materials - see patient instructions. Discussed use, benefit, and side effects of prescribed medications. All patient questions answered. Pt voiced understanding. Reviewed health maintenance. Instructed to continue current medications, diet and exercise. Patient agreed with treatment plan. Follow up as directed.      Electronically signed by Amber Damon MD on 9/27/2018 at 5:17 PM

## 2018-10-22 ENCOUNTER — OFFICE VISIT (OUTPATIENT)
Dept: PODIATRY | Age: 28
End: 2018-10-22
Payer: MEDICARE

## 2018-10-22 VITALS
SYSTOLIC BLOOD PRESSURE: 118 MMHG | HEART RATE: 80 BPM | BODY MASS INDEX: 32.01 KG/M2 | WEIGHT: 158.8 LBS | HEIGHT: 59 IN | DIASTOLIC BLOOD PRESSURE: 60 MMHG | RESPIRATION RATE: 20 BRPM

## 2018-10-22 DIAGNOSIS — M79.672 LEFT FOOT PAIN: ICD-10-CM

## 2018-10-22 DIAGNOSIS — M60.272 FOREIGN BODY GRANULOMA OF SOFT TISSUE OF LEFT FOOT: Primary | ICD-10-CM

## 2018-10-22 DIAGNOSIS — L84 CORNS AND CALLOSITIES: ICD-10-CM

## 2018-10-22 PROCEDURE — 4004F PT TOBACCO SCREEN RCVD TLK: CPT | Performed by: PODIATRIST

## 2018-10-22 PROCEDURE — G8417 CALC BMI ABV UP PARAM F/U: HCPCS | Performed by: PODIATRIST

## 2018-10-22 PROCEDURE — G8484 FLU IMMUNIZE NO ADMIN: HCPCS | Performed by: PODIATRIST

## 2018-10-22 PROCEDURE — G8427 DOCREV CUR MEDS BY ELIG CLIN: HCPCS | Performed by: PODIATRIST

## 2018-10-22 PROCEDURE — 99213 OFFICE O/P EST LOW 20 MIN: CPT | Performed by: PODIATRIST

## 2018-10-24 ENCOUNTER — OFFICE VISIT (OUTPATIENT)
Dept: PRIMARY CARE CLINIC | Age: 28
End: 2018-10-24
Payer: MEDICARE

## 2018-10-24 VITALS
HEART RATE: 88 BPM | DIASTOLIC BLOOD PRESSURE: 84 MMHG | OXYGEN SATURATION: 99 % | HEIGHT: 59 IN | TEMPERATURE: 97.6 F | SYSTOLIC BLOOD PRESSURE: 114 MMHG | BODY MASS INDEX: 32.46 KG/M2 | RESPIRATION RATE: 16 BRPM | WEIGHT: 161 LBS

## 2018-10-24 DIAGNOSIS — J20.9 BRONCHITIS WITH BRONCHOSPASM: Primary | ICD-10-CM

## 2018-10-24 PROCEDURE — 99213 OFFICE O/P EST LOW 20 MIN: CPT | Performed by: FAMILY MEDICINE

## 2018-10-24 PROCEDURE — G8484 FLU IMMUNIZE NO ADMIN: HCPCS | Performed by: FAMILY MEDICINE

## 2018-10-24 PROCEDURE — G8417 CALC BMI ABV UP PARAM F/U: HCPCS | Performed by: FAMILY MEDICINE

## 2018-10-24 PROCEDURE — 4004F PT TOBACCO SCREEN RCVD TLK: CPT | Performed by: FAMILY MEDICINE

## 2018-10-24 PROCEDURE — G8427 DOCREV CUR MEDS BY ELIG CLIN: HCPCS | Performed by: FAMILY MEDICINE

## 2018-10-24 RX ORDER — AZITHROMYCIN 250 MG/1
TABLET, FILM COATED ORAL
Qty: 1 PACKET | Refills: 0 | Status: SHIPPED | OUTPATIENT
Start: 2018-10-24 | End: 2018-10-29

## 2018-10-24 RX ORDER — PREDNISONE 20 MG/1
40 TABLET ORAL DAILY
Qty: 10 TABLET | Refills: 0 | Status: SHIPPED | OUTPATIENT
Start: 2018-10-24 | End: 2018-10-29

## 2018-10-24 ASSESSMENT — ENCOUNTER SYMPTOMS
SHORTNESS OF BREATH: 1
WHEEZING: 1
RHINORRHEA: 1
ALLERGIC/IMMUNOLOGIC NEGATIVE: 1
EYES NEGATIVE: 1
GASTROINTESTINAL NEGATIVE: 1

## 2018-10-24 ASSESSMENT — PATIENT HEALTH QUESTIONNAIRE - PHQ9
SUM OF ALL RESPONSES TO PHQ9 QUESTIONS 1 & 2: 0
SUM OF ALL RESPONSES TO PHQ QUESTIONS 1-9: 0
SUM OF ALL RESPONSES TO PHQ QUESTIONS 1-9: 0
2. FEELING DOWN, DEPRESSED OR HOPELESS: 0
1. LITTLE INTEREST OR PLEASURE IN DOING THINGS: 0

## 2018-10-25 NOTE — PROGRESS NOTES
membrane and external ear normal.   Left Ear: Tympanic membrane and external ear normal.   Nose: Mucosal edema present. No rhinorrhea. Mouth/Throat: Oropharynx is clear and moist. No oropharyngeal exudate. Eyes: Conjunctivae and EOM are normal. No scleral icterus. Neck: Neck supple. No thyromegaly present. Cardiovascular: Normal rate, regular rhythm, normal heart sounds and intact distal pulses. No murmur heard. Pulmonary/Chest: Effort normal. No respiratory distress. She has wheezes. Abdominal: Soft. Bowel sounds are normal. She exhibits no distension. There is no tenderness. There is no rebound. Musculoskeletal: Normal range of motion. She exhibits no edema or tenderness. Neurological: She is alert and oriented to person, place, and time. Skin: Skin is warm and dry. No rash noted. No erythema. Psychiatric: She has a normal mood and affect. Her behavior is normal. Judgment and thought content normal.     /84   Pulse 88   Temp 97.6 °F (36.4 °C)   Resp 16   Ht 4' 11\" (1.499 m)   Wt 161 lb (73 kg)   SpO2 99%   BMI 32.52 kg/m²     ASSESSMENT/PLAN:  Acute uri with bronchitis and bronchospasm  Discussed need for smoking cessation. zpak x 5 days  Prednisone 40mg/day x 5 days  Discussed typical course, supportive care, and complications  Increased humidity. An electronic signature was used to authenticate this note.     --Daniela Hartley MD on 10/24/2018 at 8:33 PM

## 2019-01-26 ENCOUNTER — OFFICE VISIT (OUTPATIENT)
Dept: PRIMARY CARE CLINIC | Age: 29
End: 2019-01-26
Payer: MEDICARE

## 2019-01-26 VITALS
SYSTOLIC BLOOD PRESSURE: 110 MMHG | WEIGHT: 159 LBS | HEART RATE: 100 BPM | DIASTOLIC BLOOD PRESSURE: 78 MMHG | TEMPERATURE: 99.2 F | OXYGEN SATURATION: 99 % | BODY MASS INDEX: 32.11 KG/M2

## 2019-01-26 DIAGNOSIS — R05.9 COUGH: ICD-10-CM

## 2019-01-26 DIAGNOSIS — J01.40 ACUTE NON-RECURRENT PANSINUSITIS: Primary | ICD-10-CM

## 2019-01-26 LAB
INFLUENZA A ANTIBODY: NORMAL
INFLUENZA B ANTIBODY: NORMAL

## 2019-01-26 PROCEDURE — 87804 INFLUENZA ASSAY W/OPTIC: CPT | Performed by: FAMILY MEDICINE

## 2019-01-26 PROCEDURE — G8427 DOCREV CUR MEDS BY ELIG CLIN: HCPCS | Performed by: FAMILY MEDICINE

## 2019-01-26 PROCEDURE — G8484 FLU IMMUNIZE NO ADMIN: HCPCS | Performed by: FAMILY MEDICINE

## 2019-01-26 PROCEDURE — 4004F PT TOBACCO SCREEN RCVD TLK: CPT | Performed by: FAMILY MEDICINE

## 2019-01-26 PROCEDURE — G8417 CALC BMI ABV UP PARAM F/U: HCPCS | Performed by: FAMILY MEDICINE

## 2019-01-26 PROCEDURE — 99214 OFFICE O/P EST MOD 30 MIN: CPT | Performed by: FAMILY MEDICINE

## 2019-01-26 RX ORDER — AMOXICILLIN 875 MG/1
875 TABLET, COATED ORAL 2 TIMES DAILY
Qty: 20 TABLET | Refills: 0 | Status: SHIPPED | OUTPATIENT
Start: 2019-01-26 | End: 2019-04-01

## 2019-01-26 ASSESSMENT — ENCOUNTER SYMPTOMS
TROUBLE SWALLOWING: 0
ABDOMINAL PAIN: 0
SINUS PRESSURE: 1
SINUS COMPLAINT: 1
SINUS PAIN: 1
COUGH: 1
EYE DISCHARGE: 0
WHEEZING: 0
SHORTNESS OF BREATH: 0
SORE THROAT: 1
RHINORRHEA: 1
VOMITING: 0
EYE REDNESS: 0
CONSTIPATION: 0
DIARRHEA: 0
NAUSEA: 0

## 2019-04-01 ENCOUNTER — HOSPITAL ENCOUNTER (EMERGENCY)
Age: 29
Discharge: HOME | End: 2019-04-01
Attending: EMERGENCY MEDICINE

## 2019-04-01 VITALS
OXYGEN SATURATION: 100 % | HEART RATE: 71 BPM | TEMPERATURE: 97.5 F | DIASTOLIC BLOOD PRESSURE: 79 MMHG | SYSTOLIC BLOOD PRESSURE: 112 MMHG | RESPIRATION RATE: 16 BRPM

## 2019-04-01 DIAGNOSIS — S01.81XA FOREHEAD LACERATION, INITIAL ENCOUNTER: Primary | ICD-10-CM

## 2019-04-01 PROCEDURE — 2500000003 HC RX 250 WO HCPCS: Performed by: EMERGENCY MEDICINE

## 2019-04-01 PROCEDURE — 6370000000 HC RX 637 (ALT 250 FOR IP): Performed by: EMERGENCY MEDICINE

## 2019-04-01 PROCEDURE — 99282 EMERGENCY DEPT VISIT SF MDM: CPT

## 2019-04-01 PROCEDURE — 12011 RPR F/E/E/N/L/M 2.5 CM/<: CPT

## 2019-04-01 RX ORDER — DIAPER,BRIEF,INFANT-TODD,DISP
EACH MISCELLANEOUS ONCE
Status: COMPLETED | OUTPATIENT
Start: 2019-04-01 | End: 2019-04-01

## 2019-04-01 RX ORDER — NORGESTIMATE AND ETHINYL ESTRADIOL 0.25-0.035
1 KIT ORAL
COMMUNITY
Start: 2019-03-12

## 2019-04-01 RX ORDER — LIDOCAINE HYDROCHLORIDE 10 MG/ML
20 INJECTION, SOLUTION INFILTRATION; PERINEURAL ONCE
Status: COMPLETED | OUTPATIENT
Start: 2019-04-01 | End: 2019-04-01

## 2019-04-01 RX ADMIN — BACITRACIN ZINC 1 G: 500 OINTMENT TOPICAL at 13:58

## 2019-04-01 RX ADMIN — LIDOCAINE HYDROCHLORIDE 20 ML: 10 INJECTION, SOLUTION INFILTRATION; PERINEURAL at 14:01

## 2019-04-01 ASSESSMENT — ENCOUNTER SYMPTOMS
SHORTNESS OF BREATH: 0
BACK PAIN: 0
VOMITING: 0
EYE PAIN: 0
COUGH: 0
NAUSEA: 0

## 2019-04-01 ASSESSMENT — PAIN SCALES - GENERAL
PAINLEVEL_OUTOF10: 4
PAINLEVEL_OUTOF10: 4

## 2019-04-01 ASSESSMENT — PAIN DESCRIPTION - DESCRIPTORS: DESCRIPTORS: THROBBING

## 2019-04-01 ASSESSMENT — PAIN DESCRIPTION - LOCATION: LOCATION: HEAD

## 2019-04-01 ASSESSMENT — PAIN DESCRIPTION - PAIN TYPE: TYPE: ACUTE PAIN

## 2019-04-01 NOTE — ED PROVIDER NOTES
[facility]  eMERGENCY dEPARTMENT eNCOUnter      Pt Name: Martínez Ybarra  MRN: [id number]  [place] 1990  Date of evaluation: 4/1/2019      CHIEF COMPLAINT       Chief Complaint   Patient presents with    Head Laceration         HISTORY OF PRESENT ILLNESS    Martínez Ybarra is a 33 y.o. female who presents with a laceration the right side of her head she was chasing her 4year-old milligram or hit her head on the banister no loss of consciousness just suffered a laceration just to the lateral aspect of her right eyebrow is no headache no neck pain no chest pain or other complaints her last tetanus shot was up-to-date       REVIEW OF SYSTEMS         Review of Systems   Constitutional: Negative for chills and fever. HENT: Negative for congestion and ear pain. Eyes: Negative for pain and visual disturbance. Respiratory: Negative for cough and shortness of breath. Cardiovascular: Negative for chest pain, palpitations and leg swelling. Gastrointestinal: Negative for nausea and vomiting. Endocrine: Negative for polydipsia and polyuria. Musculoskeletal: Negative for back pain, neck pain and neck stiffness. Skin: Negative for rash. Laceration to head as described   Neurological: Negative for dizziness, weakness and headaches. Hematological: Negative for adenopathy. Does not bruise/bleed easily. Psychiatric/Behavioral: Negative for confusion, self-injury and suicidal ideas. PAST MEDICAL HISTORY    has a past medical history of Asthma. SURGICAL HISTORY      has a past surgical history that includes Dental surgery. CURRENT MEDICATIONS       Previous Medications    NORGESTIMATE-ETHINYL ESTRADIOL (ORTHO-CYCLEN) 0.25-35 MG-MCG PER TABLET    Take 1 tablet by mouth       ALLERGIES     has No Known Allergies. FAMILY HISTORY     indicated that her mother is alive. She indicated that her father is alive. She indicated that both of her brothers are alive.  She indicated that her maternal grandmother is alive. She indicated that her maternal grandfather is alive. She indicated that her paternal grandmother is alive. She indicated that her paternal grandfather is alive. family history includes Cancer in her maternal grandmother; Cataracts in her maternal grandmother; Diabetes in her paternal grandfather; Glaucoma in her maternal grandmother; Heart Disease in her maternal grandmother; High Blood Pressure in her father. SOCIAL HISTORY      reports that she has been smoking cigarettes. She has a 3.00 pack-year smoking history. She has never used smokeless tobacco. She reports that she drinks alcohol. She reports that she does not use drugs. PHYSICAL EXAM     INITIAL VITALS:  tympanic temperature is 97.5 °F (36.4 °C). Her blood pressure is 129/94 (abnormal) and her pulse is 94. Her respiration is 16 and oxygen saturation is 98%. Physical Exam   Constitutional: She is oriented to person, place, and time. She appears well-developed and well-nourished. HENT:   Head: Normocephalic. Right Ear: External ear normal.   Left Ear: External ear normal.   Mouth/Throat: Oropharynx is clear and moist.   Patient has a 1/2 cm curvilinear laceration just to the right of her right eyebrow with no bony deformity she has a little bruising to her upper lid on that side pupils are equal reactive extraocular movements are intact there is no hemotympanum or Jenkins sign   Eyes: Pupils are equal, round, and reactive to light. Conjunctivae and EOM are normal.   Neck: Normal range of motion. Cardiovascular: Normal rate and regular rhythm. Pulmonary/Chest: Effort normal and breath sounds normal.   Musculoskeletal: She exhibits no edema or tenderness. Neurological: She is alert and oriented to person, place, and time. Skin: Skin is warm and dry. Psychiatric: She has a normal mood and affect.  Her behavior is normal.         DIFFERENTIAL DIAGNOSIS/ MDM:     1.5 severe laceration

## 2020-09-22 ENCOUNTER — OFFICE VISIT (OUTPATIENT)
Dept: PRIMARY CARE CLINIC | Age: 30
End: 2020-09-22
Payer: COMMERCIAL

## 2020-09-22 ENCOUNTER — HOSPITAL ENCOUNTER (OUTPATIENT)
Age: 30
Setting detail: SPECIMEN
Discharge: HOME | End: 2020-09-22
Payer: COMMERCIAL

## 2020-09-22 VITALS
HEIGHT: 59 IN | BODY MASS INDEX: 36.29 KG/M2 | WEIGHT: 180 LBS | DIASTOLIC BLOOD PRESSURE: 80 MMHG | SYSTOLIC BLOOD PRESSURE: 124 MMHG | OXYGEN SATURATION: 99 % | HEART RATE: 108 BPM | TEMPERATURE: 98.9 F

## 2020-09-22 PROCEDURE — 87205 SMEAR GRAM STAIN: CPT

## 2020-09-22 PROCEDURE — 87070 CULTURE OTHR SPECIMN AEROBIC: CPT

## 2020-09-22 PROCEDURE — 99203 OFFICE O/P NEW LOW 30 MIN: CPT | Performed by: NURSE PRACTITIONER

## 2020-09-22 RX ORDER — SULFAMETHOXAZOLE AND TRIMETHOPRIM 800; 160 MG/1; MG/1
1 TABLET ORAL 2 TIMES DAILY
Qty: 14 TABLET | Refills: 0 | Status: SHIPPED | OUTPATIENT
Start: 2020-09-22 | End: 2020-09-29

## 2020-09-22 RX ORDER — TRAMADOL HYDROCHLORIDE 50 MG/1
50 TABLET ORAL EVERY 6 HOURS PRN
Qty: 8 TABLET | Refills: 0 | Status: SHIPPED | OUTPATIENT
Start: 2020-09-22 | End: 2020-09-25

## 2020-09-22 RX ORDER — CEPHALEXIN 500 MG/1
500 CAPSULE ORAL 4 TIMES DAILY
Qty: 40 CAPSULE | Refills: 0 | Status: SHIPPED | OUTPATIENT
Start: 2020-09-22 | End: 2020-10-02

## 2020-09-22 ASSESSMENT — ENCOUNTER SYMPTOMS
RHINORRHEA: 1
VOMITING: 0
SHORTNESS OF BREATH: 0
COUGH: 1
ABDOMINAL PAIN: 0
NAUSEA: 0

## 2020-09-22 NOTE — PATIENT INSTRUCTIONS
Patient Education        Pilonidal Abscess: Care Instructions  Your Care Instructions     A pilonidal abscess is an infection caused by an ingrown hair. The abscess occurs in the area of the tailbone and the top of the buttocks. The infection causes a pocket of pus to form. It can be quite painful. Your doctor may have opened and drained the abscess. You can take care of yourself at home to help the area heal. In some cases, the abscess returns. Your doctor may suggest surgery to remove the site of the infection if it comes back. You may have had a sedative to help you relax. You may be unsteady after having sedation. It can take a few hours for the medicine's effects to wear off. Common side effects of sedation include nausea, vomiting, and feeling sleepy or tired. The doctor has checked you carefully, but problems can develop later. If you notice any problems or new symptoms, get medical treatment right away. Follow-up care is a key part of your treatment and safety. Be sure to make and go to all appointments, and call your doctor if you are having problems. It's also a good idea to know your test results and keep a list of the medicines you take. How can you care for yourself at home? · If the doctor gave you a sedative:  ? For 24 hours, don't do anything that requires attention to detail. This includes going to work, making important decisions, or signing any legal documents. It takes time for the medicine's effects to completely wear off.  ? For your safety, do not drive or operate any machinery that could be dangerous. Wait until the medicine wears off and you can think clearly and react easily. · If your doctor prescribed antibiotics, take them exactly as directed. Do not stop taking them just because you feel better. You need to take the full course of antibiotics. · Be safe with medicines. Take pain medicines exactly as directed.   ? If the doctor gave you a prescription medicine for pain, take it as prescribed. ? If you are not taking a prescription pain medicine, ask your doctor if you can take an over-the-counter medicine. · If your doctor opened and drained your abscess, you may have gauze or other packing material inside your wound. Follow all instructions from your doctor on how to care for your wound. · Keep the area of your wound very clean. Use wet cotton balls, a warm washcloth, or baby wipes. Clean the area gently, especially after a bowel movement. When should you call for help? [id number] anytime you think you may need emergency care. For example, call if:  · You have trouble breathing. · You passed out (lost consciousness). Call your doctor now or seek immediate medical care if:  · You have new or worse nausea or vomiting. · You have symptoms of infection, such as:  ? Increased pain, swelling, warmth, or redness. ? Red streaks leading from the area. ? Pus draining from the area. ? A fever. Watch closely for changes in your health, and be sure to contact your doctor if:  · You do not get better as expected. Where can you learn more? Go to https://LibboopePlaylogiceb.[employer]. org and sign in to your [employer] account. Enter G816 in the [employer] box to learn more about \"Pilonidal Abscess: Care Instructions. \"     If you do not have an account, please click on the \"Sign Up Now\" link. Current as of: October 31, 2019               Content Version: 12.5  © 4259-5230 Healthwise, Incorporated. Care instructions adapted under license by [facility] [address] If you have questions about a medical condition or this instruction, always ask your healthcare professional. [place] [address] any warranty or liability for your use of this information.

## 2020-09-22 NOTE — PROGRESS NOTES
[address] [place], [place] [id number]  Dept: [phone]  Dept Fax: [phone]  Loc: [phone]        CHIEF COMPLAINT       Chief Complaint   Patient presents with    Cyst     x2 days on top of bottom. tried heat, warm bath. red. Nurses Notes reviewed and I agree except as noted in the HPI. HISTORY OF PRESENT ILLNESS   Wilber Goodwin is a 35 y.o. female who presents with sore cyst-like area that started 2-3 days ago. Pt states thought area was a zit so washed area and treated with topical acne cream.  Area has worsened. Pt has tried heat, Epsom salt bath, ibuprofen with minimal relief. Denies any know drainage. Denies fever, body aches, chills. Has not been on antibiotics recently; was prescribed an antibiotic for bronchitis but took first dose and threw up. Has had small cysts/abscesses in the past but not in this area. REVIEW OF SYSTEMS     Review of Systems   Constitutional: Negative for chills and fever. HENT: Positive for rhinorrhea. Respiratory: Positive for cough (occasional, recently tx for bronchitis, covid neg). Negative for shortness of breath. Cardiovascular: Negative for chest pain. Gastrointestinal: Negative for abdominal pain, nausea and vomiting. PAST MEDICAL HISTORY         Diagnosis Date    Asthma     as a child       SURGICAL HISTORY     Patient  has a past surgical history that includes Dental surgery. CURRENT MEDICATIONS       Outpatient Medications Prior to Visit   Medication Sig Dispense Refill    norgestimate-ethinyl estradiol (ORTHO-CYCLEN) 0.25-35 MG-MCG per tablet Take 1 tablet by mouth       No facility-administered medications prior to visit. ALLERGIES     Patient is has No Known Allergies.     FAMILY HISTORY     Patient's family history includes Cancer in her maternal grandmother; Cataracts in her maternal grandmother; Diabetes in her paternal grandfather; Glaucoma in her maternal grandmother; Heart Disease in her maternal grandmother; High Blood Pressure in her father. SOCIAL HISTORY     Patient  reports that she has been smoking cigarettes. She has a 3.00 pack-year smoking history. She has never used smokeless tobacco. She reports current alcohol use. She reports that she does not use drugs. PHYSICAL EXAM     VITALS  BP: 124/80, Temp: 98.9 °F (37.2 °C), Pulse: 108,  , SpO2: 99 %  Physical Exam  Constitutional:       General: She is in acute distress (appears to be uncomfortable, unable to sit). Appearance: Normal appearance. She is not ill-appearing. Cardiovascular:      Rate and Rhythm: Normal rate and regular rhythm. Heart sounds: Normal heart sounds. Pulmonary:      Effort: Pulmonary effort is normal. No respiratory distress. Breath sounds: Normal breath sounds. Genitourinary:     Comments: 2 cm tender, erythematous abscess noted along top of right buttock at coccyx area. Areas surrounding abscess warm, firm, very tender. No active drainage noted. Lymphadenopathy:      Cervical: No cervical adenopathy. Skin:     General: Skin is warm. Capillary Refill: Capillary refill takes less than 2 seconds. Neurological:      General: No focal deficit present. Mental Status: She is alert. DIAGNOSTIC RESULTS   Labs:No results found for this visit on 09/22/20. IMAGING:        CLINICAL COURSE:     Vitals:    09/22/20 1826   BP: 124/80   Site: Left Upper Arm   Position: Standing   Cuff Size: Medium Adult   Pulse: 108   Temp: 98.9 °F (37.2 °C)   TempSrc: Tympanic   SpO2: 99%   Weight: 180 lb (81.6 kg)   Height: 4' 11\" (1.499 m)           PROCEDURES:  None  FINAL IMPRESSION      1. Abscess of anal or rectal region         DISPOSITION/PLAN     Area appears to be a pilonidal cyst.  Additional provider in urgent care RUPERT Patterson CNP in room to perform I&D of area. Wound culture collected.  Pt referred to General Surgery. Rx of keflex and bactrim sent in for pt along with short course of tramadol for pain control. Patient Instructions     Patient Education        Pilonidal Abscess: Care Instructions  Your Care Instructions     A pilonidal abscess is an infection caused by an ingrown hair. The abscess occurs in the area of the tailbone and the top of the buttocks. The infection causes a pocket of pus to form. It can be quite painful. Your doctor may have opened and drained the abscess. You can take care of yourself at home to help the area heal. In some cases, the abscess returns. Your doctor may suggest surgery to remove the site of the infection if it comes back. You may have had a sedative to help you relax. You may be unsteady after having sedation. It can take a few hours for the medicine's effects to wear off. Common side effects of sedation include nausea, vomiting, and feeling sleepy or tired. The doctor has checked you carefully, but problems can develop later. If you notice any problems or new symptoms, get medical treatment right away. Follow-up care is a key part of your treatment and safety. Be sure to make and go to all appointments, and call your doctor if you are having problems. It's also a good idea to know your test results and keep a list of the medicines you take. How can you care for yourself at home? · If the doctor gave you a sedative:  ? For 24 hours, don't do anything that requires attention to detail. This includes going to work, making important decisions, or signing any legal documents. It takes time for the medicine's effects to completely wear off.  ? For your safety, do not drive or operate any machinery that could be dangerous. Wait until the medicine wears off and you can think clearly and react easily. · If your doctor prescribed antibiotics, take them exactly as directed. Do not stop taking them just because you feel better.  You need to take the full course of antibiotics. · Be safe with medicines. Take pain medicines exactly as directed. ? If the doctor gave you a prescription medicine for pain, take it as prescribed. ? If you are not taking a prescription pain medicine, ask your doctor if you can take an over-the-counter medicine. · If your doctor opened and drained your abscess, you may have gauze or other packing material inside your wound. Follow all instructions from your doctor on how to care for your wound. · Keep the area of your wound very clean. Use wet cotton balls, a warm washcloth, or baby wipes. Clean the area gently, especially after a bowel movement. When should you call for help? [id number] anytime you think you may need emergency care. For example, call if:  · You have trouble breathing. · You passed out (lost consciousness). Call your doctor now or seek immediate medical care if:  · You have new or worse nausea or vomiting. · You have symptoms of infection, such as:  ? Increased pain, swelling, warmth, or redness. ? Red streaks leading from the area. ? Pus draining from the area. ? A fever. Watch closely for changes in your health, and be sure to contact your doctor if:  · You do not get better as expected. Where can you learn more? Go to https://Cryoport.[employer]. org and sign in to your [employer] account. Enter R091 in the [facility] box to learn more about \"Pilonidal Abscess: Care Instructions. \"     If you do not have an account, please click on the \"Sign Up Now\" link. Current as of: October 31, 2019               Content Version: 12.5  © 0743-3297 Healthwise, Incorporated. Care instructions adapted under license by [facility] ([facility]). If you have questions about a medical condition or this instruction, always ask your healthcare professional. [place] [address] any warranty or liability for your use of this information.              Orders Placed This Encounter   Procedures    Culture, Wound

## 2020-09-25 ENCOUNTER — INITIAL CONSULT (OUTPATIENT)
Dept: SURGERY | Age: 30
End: 2020-09-25
Payer: COMMERCIAL

## 2020-09-25 VITALS
OXYGEN SATURATION: 93 % | HEIGHT: 59 IN | WEIGHT: 177.4 LBS | DIASTOLIC BLOOD PRESSURE: 70 MMHG | BODY MASS INDEX: 35.76 KG/M2 | HEART RATE: 90 BPM | TEMPERATURE: 97.7 F | SYSTOLIC BLOOD PRESSURE: 112 MMHG

## 2020-09-25 LAB
CULTURE: ABNORMAL
DIRECT EXAM: ABNORMAL
Lab: ABNORMAL
SPECIMEN DESCRIPTION: ABNORMAL

## 2020-09-25 PROCEDURE — 99203 OFFICE O/P NEW LOW 30 MIN: CPT | Performed by: SURGERY

## 2020-09-25 RX ORDER — ALBUTEROL SULFATE 90 UG/1
2 AEROSOL, METERED RESPIRATORY (INHALATION) EVERY 4 HOURS PRN
COMMUNITY
Start: 2020-09-15

## 2020-09-25 NOTE — PROGRESS NOTES
Ron Bailey is a 35 y.o. female who presents today for evaluation of suspected pilonidal cyst.  Patient reports that the beginning of this week she began to have some pain and swelling in the upper aspect of the buttocks in the midline. This started out as what the patient describes as a pimple but was getting larger and more painful so she went to urgent care early in the week. She was diagnosed with a pilonidal abscess which was incised and drained. She was started on antibiotics including Keflex as well as Bactrim and took them on Tuesday and Wednesday. Unfortunately yesterday she was having some emesis that she thought may be related to these so she is not taking them yesterday or today. She does continue to report some bloody drainage from the area and still having pain but improved from prior. She denies any similar symptoms previously in the same area. She does report that from time to time she will have small pimples that she pops her self and they usually heal without any issues. Patient is not diabetic. She does report tobacco use and currently smokes 1 pack/day. No recent trauma to the area.     Past Medical History:   Diagnosis Date    Asthma     as a child       Past Surgical History:   Procedure Laterality Date    DENTAL SURGERY      dental exrtraction       Current Outpatient Medications   Medication Sig Dispense Refill    sertraline (ZOLOFT) 50 MG tablet Take 50 mg by mouth daily      norgestimate-ethinyl estradiol (ORTHO-CYCLEN) 0.25-35 MG-MCG per tablet Take 1 tablet by mouth      albuterol sulfate  (90 Base) MCG/ACT inhaler Inhale 2 puffs into the lungs every 4 hours as needed      cephALEXin (KEFLEX) 500 MG capsule Take 1 capsule by mouth 4 times daily for 10 days (Patient not taking: Reported on 9/25/2020) 40 capsule 0    sulfamethoxazole-trimethoprim (BACTRIM DS;SEPTRA DS) 800-160 MG per tablet Take 1 tablet by mouth 2 times daily for 7 days (Patient not changes to her antibiotics. I did discuss with her surgical and nonsurgical options for management. Have offered her conservative management if she does not wish to proceed with surgery versus allowing the area to heal and then proceeding with a surgical excision of the area. After discussion she would like to proceed conservatively for now with the understanding that if she continues to have issues at that point we could always proceed with surgery. Otherwise if she does well with no further problems she may follow-up as needed. Patient will call the office if she has any concerns or questions.     Electronically signed by Veronica Villafuerte DO on 9/25/2020 at 3:01 PM      (Please note that portions of this note were completed with a voice recognition program.  Efforts were made to edit the dictations but occasionally words are mis-transcribed.)